# Patient Record
Sex: MALE | Race: WHITE
[De-identification: names, ages, dates, MRNs, and addresses within clinical notes are randomized per-mention and may not be internally consistent; named-entity substitution may affect disease eponyms.]

---

## 2018-03-08 ENCOUNTER — HOSPITAL ENCOUNTER (OUTPATIENT)
Dept: HOSPITAL 56 - MW.SDS | Age: 53
Discharge: HOME | End: 2018-03-08
Attending: SURGERY
Payer: COMMERCIAL

## 2018-03-08 DIAGNOSIS — F43.10: ICD-10-CM

## 2018-03-08 DIAGNOSIS — F17.290: ICD-10-CM

## 2018-03-08 DIAGNOSIS — Z79.4: ICD-10-CM

## 2018-03-08 DIAGNOSIS — D12.4: ICD-10-CM

## 2018-03-08 DIAGNOSIS — Z79.899: ICD-10-CM

## 2018-03-08 DIAGNOSIS — Z99.89: ICD-10-CM

## 2018-03-08 DIAGNOSIS — Z12.11: Primary | ICD-10-CM

## 2018-03-08 DIAGNOSIS — E10.9: ICD-10-CM

## 2018-03-08 DIAGNOSIS — E66.9: ICD-10-CM

## 2018-03-08 DIAGNOSIS — I10: ICD-10-CM

## 2018-03-08 DIAGNOSIS — Z91.041: ICD-10-CM

## 2018-03-08 DIAGNOSIS — F41.9: ICD-10-CM

## 2018-03-08 DIAGNOSIS — Z86.718: ICD-10-CM

## 2018-03-08 DIAGNOSIS — Z79.82: ICD-10-CM

## 2018-03-08 DIAGNOSIS — G47.19: ICD-10-CM

## 2018-03-08 DIAGNOSIS — G47.33: ICD-10-CM

## 2018-03-08 DIAGNOSIS — D12.5: ICD-10-CM

## 2018-03-08 DIAGNOSIS — G47.00: ICD-10-CM

## 2018-03-08 PROCEDURE — 43239 EGD BIOPSY SINGLE/MULTIPLE: CPT

## 2018-03-08 PROCEDURE — 45380 COLONOSCOPY AND BIOPSY: CPT

## 2018-03-08 NOTE — OR
SURGEON:

JUNAID ODONNELL MD

 

DATE OF PROCEDURE:  03/08/2018

 

PREOPERATIVE DIAGNOSES:

1. Abdominal pain.

2. Screening colonoscopy.

 

POSTOPERATIVE DIAGNOSES:

1. Normal esophagogastroduodenoscopy.

2. Sigmoid colon polyp.

3. Descending colon polyp.

 

PROCEDURE PERFORMED:

Diagnostic esophagogastroduodenoscopy and screening colonoscopy.

 

ANESTHESIA:

MAC.

 

INSTRUMENT USED:

Olympus endoscope and colonoscope.

 

EXTENT OF EXAM:

To the second portion of duodenum, to the cecum.

 

PREPARATION:

Good.

 

LIMITATIONS:

None.

 

INDICATION FOR EXAMINATION:

The patient is a 52-year-old male with a history of chronic pancreatitis, who

presents with chronic upper abdominal pain that has made better with the use of

over-the-counter antacids.  The decision was made to perform a diagnostic EGD to

rule out any gastritis or esophagitis.  The patient is also due for a screening

colonoscopy.  We discussed both procedures as well as expected perioperative

course.  We discussed the risks including bleeding, infection, damage to

surrounding structures including perforation.  The patient verbalized

understanding and wishes to proceed.

 

PROCEDURE IN DETAIL:

The patient was brought into the endoscopy suite and placed in left lateral

decubitus position.  A time-out was completed verifying the patient's name, age,

date of birth, allergies, and procedure to be performed.  A bite block was

placed in the patient's mouth and monitored anesthesia care was induced.

Continuous oxygen was provided via nasal cannula throughout the procedure.

After adequate sedation was achieved, a well lubricated endoscope was placed in

the patient's mouth and advanced under direct visualization to the level of the

second portion of duodenum.  This appeared normal and a photograph was taken.

The scope was then fully withdrawn while examining the color, texture, anatomy,

and integrity of the mucosa of the upper GI tract.  The mucosa of the small

intestine appeared normal and to be free of pathology.  The scope was brought in

the stomach and a photograph was taken of the pylorus and GE junction.  Both of

these appeared normal.  The stomach mucosa itself had no evidence of

inflammation or ulceration.  Biopsies were taken of the gastric antrum, body,

and fundus and sent for pathologic review.  The scope was then brought into the

distal esophagus.  A photograph was taken of the Z-line, which appeared normal.

The scope was then fully withdrawn while examining the esophageal mucosa.  This

all appeared normal.  The scope was then removed from the patient.  This portion

of procedure was terminated.  A digital rectal exam was performed.  This exam

was within normal limits.  A well lubricated colonoscope was inserted in the

rectum and advanced under direct visualization to the level of the cecum.  The

cecum was identified by both visual and anatomic landmarks.  A photograph was

taken of the cecal cap.  I was able to retroflex the scope within the cecum, but

unable to take a picture.  The scope was then fully withdrawn while examining

the color, texture, anatomy, and integrity of the mucosa from the cecum to the

anal canal.  The patient was found to have 2 to 3 mm polyps within the

descending colon and sigmoid colon.  These were removed using a cold biopsy

forceps.  The scope was then brought into the rectum and retroflexed to allow

visualization of the anal canal opening.  This appeared normal and a photograph

was taken.  The scope was then straightened out and removed from the patient.

Cecum to anus time was 8 minutes.  The patient tolerated the procedure well and

was taken to PACU in stable condition.

 

ENDOSCOPIC DIAGNOSES:

1. Normal esophagogastroduodenoscopy.

2. Sigmoid colon polyp.

3. Descending colon polyp.

 

RECOMMENDATIONS:

Follow up in clinic in 2 weeks.

 

 

SHOSHANA NAVARRETE

DD:  03/08/2018 14:41:17

DT:  03/08/2018 15:46:08

Job #:  [id number]

## 2018-03-08 NOTE — PCM.PREANE
Preanesthetic Assessment





- Anesthesia/Transfusion/Family Hx


Anesthesia History: Prior Anesthesia Without Reaction


Family History of Anesthesia Reaction: No


Transfusion History: Prior Transfusion Without Reaction


Intubation History: Unknown





- Review of Systems


General: No Symptoms


Pulmonary: No Symptoms


Cardiovascular: No Symptoms


Gastrointestinal: Abdominal Pain


Neurological: No Symptoms


Other: Reports: None





- Physical Assessment


Height: 1.73 m


Weight: 91.626 kg


ASA Class: 3


Mental Status: Alert & Oriented x3


Airway Class: Mallampati = 2


Dentition: Reports: Normal Dentition


Thyro-Mental Finger Breadths: 3


Mouth Opening Finger Breadths: 3


ROM/Head Extension: Full


Lungs: Clear to Auscultation, Normal Respiratory Effort


Cardiovascular: Regular Rate, Regular Rhythm





- Allergies


Allergies/Adverse Reactions: 


 Allergies











Allergy/AdvReac Type Severity Reaction Status Date / Time


 


Iodinated Contrast- Oral and Allergy  Anaphylactic Verified 03/05/18 12:26





IV Dye   Shock  














- Blood


Blood Available: No





- Anesthesia Plan


Pre-Op Medication Ordered: None





- Acknowledgements


Anesthesia Type Planned: MAC


Pt an Appropriate Candidate for the Planned Anesthesia: Yes


Alternatives and Risks of Anesthesia Discussed w Pt/Guardian: Yes


Pt/Guardian Understands and Agrees with Anesthesia Plan: Yes





PreAnesthesia Questionnaire


Cardiovascular History: Reports: Blood Clots/VTE/DVT, Hypertension


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 

caused multiple organ failure, in a coma x4 weeks, developed blood clots in his 

legs, had IVC filter inserted as a precaution


Respiratory History: Reports: Sleep Apnea


Other Respiratory History: does not use a CPAP


Gastrointestinal History: Reports: GERD, Pancreatitis, Other (See Below)


Other Gastrointestinal History: h/o chronic pancreatitis sec to ETOH abuse, has 

pancreatic pseudocyst


Musculoskeletal History: Reports: Back Pain, Chronic, Fracture


Other Musculoskeletal History: hx of fx hand


Psychiatric History: Reports: Anxiety, PTSD


Endocrine/Metabolic History: Reports: Diabetes, Type I (A1C 6.1 recently), 

Obesity/BMI 30+





- Past Surgical History


Head Surgeries/Procedures: Reports: None


Cardiovascular Surgical History: Reports: Other (See Below)


Other Cardiovascular Surgeries/Procedures: insertion of IVC filter


GI Surgical History: Reports: EGD


Musculoskeletal Surgical History: Reports: Arthroscopic Knee (rt. knee open 

surgery), Other (See Below)


Other Musculoskeletal Surgeries/Procedures:: closed reduction percutaneus 

pinning right hand- pin removed





- SUBSTANCE USE


Smoking Status *Q: Current Every Day Smoker


Tobacco Use Within Last Twelve Months: Smokeless Tobacco


Recreational Drug Use History: No





- HOME MEDS


Home Medications: 


 Home Meds





Aspirin [Adult Low Dose Aspirin EC] 81 mg PO DAILY 03/05/18 [History]


Buprenorphine HCl/Naloxone HCl [Suboxone 4 mg-1 mg Sl Film] 2 - 3 mg PO DAILY 03 /05/18 [History]


Hydrochlorothiazide 25 mg PO DAILY 03/05/18 [History]


Insulin Glarg,Human.Rec.Analog [Lantus] 20 unit SQ BEDTIME 03/05/18 [History]


Insulin Lispro [HumaLOG] 5 unit SQ TIDMEALS 03/05/18 [History]


Lisinopril 20 mg PO DAILY 03/05/18 [History]











- CURRENT (IN HOUSE) MEDS


Current Meds: 





 Current Medications





Lactated Ringer's (Ringers, Lactated)  1,000 mls @ 125 mls/hr IV ASDIRECTED GUILLERMO


   Last Admin: 03/08/18 12:23 Dose:  125 mls/hr


Sodium Chloride (Saline Flush)  10 ml FLUSH ASDIRECTED PRN


   PRN Reason: Keep Vein Open


Sodium Chloride (Saline Flush)  2.5 ml FLUSH ASDIRECTED PRN


   PRN Reason: Keep Vein Open


Sodium Chloride (Saline Flush)  10 ml FLUSH ASDIRECTED PRN


   PRN Reason: Keep Vein Open


Sodium Chloride (Saline Flush)  2.5 ml FLUSH ASDIRECTED PRN


   PRN Reason: Keep Vein Open





Discontinued Medications





Lidocaine (Xylocaine-Mpf 2%) Confirm Administered Dose 5 ml .ROUTE .STK-MED ONE


   Stop: 03/08/18 07:34


Propofol (Diprivan  20 Ml) Confirm Administered Dose 400 mg .ROUTE .STK-MED ONE


   Stop: 03/08/18 07:35


Propofol (Diprivan  20 Ml) Confirm Administered Dose 1,400 mg .ROUTE .STK-MED 

ONE


   Stop: 03/08/18 07:38

## 2021-08-30 NOTE — EDM.PDOC
ED HPI GENERAL MEDICAL PROBLEM





- General


Chief Complaint: Diabetic Complaint


Stated Complaint: LOW BLOOD SUGAR


Time Seen by Provider: 08/30/21 23:29





- History of Present Illness


INITIAL COMMENTS - FREE TEXT/NARRATIVE: 


56-year-old male with a history of type 1 diabetes, prior IVC filter related 

blood clots on Eliquis, hypertension presents with stubborn hypoglycemia.  

Patient notes that over the last 2 to 3 days he has had some unusually low blood

sugars.  This evening the patient took 2 units of fast acting insulin with 

dinner and then about an hour later took his normal long-acting insulin shot.  

Over the last few hours he is been unable to keep his blood sugar up despite a 

large amount of p.o. sugar.  They were able to get it up to 100 but then it 

dropped again to 40.  On EMS arrival he was given D50 and then started on D10 

normal saline.  The patient denies any other symptoms, no cough no chest pain no

shortness of breath no abdominal pain no dysuria no hematuria no fever.  He has 

not had trouble with stubborn hypoglycemia in the past.


Treatments PTA: Reports: IV/IO, Other Medication(s)


Other Treatments PTA: D 10 infusing





- Related Data


                                    Allergies











Allergy/AdvReac Type Severity Reaction Status Date / Time


 


Iodinated Contrast Media Allergy  Anaphylactic Verified 08/30/21 23:42





[Iodinated Contrast- Oral   Shock  





and IV Dye]     











Home Meds: 


                                    Home Meds





Buprenorphine HCl/Naloxone HCl [Suboxone 4 mg-1 mg Sl Film] 2 - 3 mg PO DAILY 

03/05/18 [History]


Insulin Glarg,Human.Rec.Analog [Lantus] 20 unit SQ BEDTIME 03/05/18 [History]


Insulin Lispro [HumaLOG] 5 unit SQ TIDMEALS 03/05/18 [History]


Lisinopril 20 mg PO DAILY 03/05/18 [History]


hydroCHLOROthiazide [Hydrochlorothiazide] 25 mg PO DAILY 03/05/18 [History]


Apixaban [Eliquis] 5 mg PO BID 08/30/21 [History]











Past Medical History


Cardiovascular History: Reports: Blood Clots/VTE/DVT, Hypertension


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 

caused multiple organ failure, in a coma x4 weeks, developed blood clots in his 

legs, had IVC filter inserted as a precaution


Respiratory History: Reports: Sleep Apnea


Other Respiratory History: does not use a CPAP


Gastrointestinal History: Reports: GERD, Pancreatitis, Other (See Below)


Other Gastrointestinal History: h/o chronic pancreatitis sec to ETOH abuse, has 

pancreatic pseudocyst


Musculoskeletal History: Reports: Back Pain, Chronic, Fracture


Other Musculoskeletal History: hx of fx hand


Psychiatric History: Reports: Anxiety, PTSD


Endocrine/Metabolic History: Reports: Diabetes, Type I (A1C 6.1 recently), 

Obesity/BMI 30+





- Past Surgical History


Head Surgeries/Procedures: Reports: None


Cardiovascular Surgical History: Reports: Other (See Below)


Other Cardiovascular Surgeries/Procedures: insertion of IVC filter


GI Surgical History: Reports: EGD


Musculoskeletal Surgical History: Reports: Arthroscopic Knee (rt. knee open 

surgery), Other (See Below)


Other Musculoskeletal Surgeries/Procedures:: closed reduction percutaneus 

pinning right hand- pin removed





ED ROS GENERAL





- Review of Systems


Review Of Systems: See Below


Free Text/Narrative/Comment: 





General: No fever.


Skin: No rash.


Eyes: No vision problems.


ENT: No sore throat.


Neck: No neck stiffness.


Respiratory: No shortness of breath.


Cardiac: No chest pain.


Gastrointestinal: No nausea, vomiting or abdominal pain.


Urinary: No dysuria.


Musculoskeletal: No myalgias/arthralgias.


Neurologic: No headache.





ED EXAM GENERAL NO PERIP PULSE





- Physical Exam


Exam: See Below


Text/Narrative:: 





General Appearance: No acute distress, appears comfortable


Skin: No rash


HEENT: Normocephalic/atraumatic, sclera anicteric, mucous membranes moist


Neck: Normal range of motion


Chest and Lungs: Bilateral breath sounds, clear to auscultation


Cardiovascular: Regular rate and rhythm, no murmur


Abdomen: Soft, non-tender


Back: Normal


Musculoskeletal: No edema or tenderness


Neurologic: Awake, alert, no obvious deficits, moving all extremities


Psychiatric: Appropriate, cooperative





Course





- Vital Signs


Last Recorded V/S: 


                                Last Vital Signs











Temp  97.9 F   08/30/21 23:39


 


Pulse  60   08/30/21 23:39


 


Resp  16   08/30/21 23:39


 


BP  105/63   08/30/21 23:39


 


Pulse Ox  95   08/30/21 23:39














- Orders/Labs/Meds


Orders: 


                               Active Orders 24 hr











 Category Date Time Status


 


 Patient Status [ADT] Routine ADT  08/31/21 00:51 Active


 


 Accu Check [Blood Glucose Check, Bedside] [RC] ONETIME Care  08/31/21 00:30 

Active


 


 Abdomen Pelvis w wo Cont [CT] Stat Exams  08/31/21 00:39 Stop Req


 


 CORONAVIRUS COVID-19 CRISTY [MOLEC] Stat Lab  08/31/21 00:58 Ordered


 


 Dextrose 5%-Normal Saline @ 100 MLS/HR(1000ml) Med  08/31/21 01:00 Ordered





 Dextrose 5%-0.9% NaCl [Dextrose 5%-Normal Saline] 1,000   





 ml   





 IV ASDIRECTED   


 


 Sodium Chloride 0.9% [Saline Flush] Med  08/30/21 23:53 Active





 10 ml FLUSH ASDIRECTED PRN   


 


 Sodium Chloride 0.9% [Saline Flush] Med  08/30/21 23:53 Active





 2.5 ml FLUSH ASDIRECTED PRN   


 


 Sodium Chloride 23.4% 154 meq Med  08/31/21 01:00 Active





 Dextrose 10% in Water 1,000 ml   





 IV ASDIRECTED   


 


 Saline Lock Insert [OM.PC] Stat Oth  08/30/21 23:54 Ordered








                                Medication Orders





Sodium Chloride 154 meq/ (Dextrose/Water)  1,038.5 mls @ 80 mls/hr IV ASDIRECTED

 GUILLERMO


Sodium Chloride (Sodium Chloride 0.9% 10 Ml Syringe)  10 ml FLUSH ASDIRECTED PRN


   PRN Reason: Keep Vein Open


   Last Admin: 08/31/21 00:31  Dose: 10 ml


   Documented by: FLOYD


Sodium Chloride (Sodium Chloride 0.9% 2.5 Ml Syringe)  2.5 ml FLUSH ASDIRECTED 

PRN


   PRN Reason: Keep Vein Open


   Last Admin: 08/31/21 00:31  Dose: 2.5 ml


   Documented by: FLOYD








Labs: 


                                Laboratory Tests











  08/30/21 08/30/21 08/31/21 Range/Units





  23:43 23:43 00:44 


 


WBC  7.11    (4.0-11.0)  K/uL


 


RBC  4.68    (4.50-5.90)  M/uL


 


Hgb  13.0    (13.0-17.0)  g/dL


 


Hct  37.9 L    (38.0-50.0)  %


 


MCV  81.0    (80.0-98.0)  fL


 


MCH  27.8    (27.0-32.0)  pg


 


MCHC  34.3    (31.0-37.0)  g/dL


 


RDW Std Deviation  39.9    (28.0-62.0)  fl


 


RDW Coeff of Demetrius  14    (11.0-15.0)  %


 


Plt Count  198    (150-400)  K/uL


 


MPV  10.20    (7.40-12.00)  fL


 


Neut % (Auto)  70.9    (48.0-80.0)  %


 


Lymph % (Auto)  16.2    (16.0-40.0)  %


 


Mono % (Auto)  11.5    (0.0-15.0)  %


 


Eos % (Auto)  1.0    (0.0-7.0)  %


 


Baso % (Auto)  0.4    (0.0-1.5)  %


 


Neut # (Auto)  5.0    (1.4-5.7)  K/uL


 


Lymph # (Auto)  1.2    (0.6-2.4)  K/uL


 


Mono # (Auto)  0.8    (0.0-0.8)  K/uL


 


Eos # (Auto)  0.1    (0.0-0.7)  K/uL


 


Baso # (Auto)  0.0    (0.0-0.1)  K/uL


 


Nucleated RBC %  0.0    /100WBC


 


Nucleated RBCs #  0    K/uL


 


Sodium   143   (136-148)  mmol/L


 


Potassium   3.4 L   (3.5-5.1)  mmol/L


 


Chloride   101   ()  mmol/L


 


Carbon Dioxide   33.8 H   (21.0-32.0)  mmol/L


 


BUN   20 H   (7.0-18.0)  mg/dL


 


Creatinine   1.2   (0.8-1.3)  mg/dL


 


Est Cr Clr Drug Dosing   TNP   


 


Estimated GFR (MDRD)   > 60.0   ml/min


 


Glucose   74   ()  mg/dL


 


POC Glucose    158 H  (70-99)  mg/dL


 


Calcium   8.8   (8.5-10.1)  mg/dL


 


Total Bilirubin   0.5   (0.2-1.0)  mg/dL


 


AST   18   (15-37)  IU/L


 


ALT   25   (14-63)  IU/L


 


Alkaline Phosphatase   61   ()  U/L


 


Total Protein   6.3 L   (6.4-8.2)  g/dL


 


Albumin   3.7   (3.4-5.0)  g/dL


 


Globulin   2.6   (2.6-4.0)  g/dL


 


Albumin/Globulin Ratio   1.4   (0.9-1.6)  


 


Lipase   20 L   ()  U/L











Meds: 


Medications











Generic Name Dose Route Start Last Admin





  Trade Name Freq  PRN Reason Stop Dose Admin


 


Sodium Chloride 154 meq/  1,038.5 mls @ 80 mls/hr  08/31/21 01:00 





  Dextrose/Water  IV  





  ASDIRECTED GUILLERMO  


 


Sodium Chloride  10 ml  08/30/21 23:53  08/31/21 00:31





  Sodium Chloride 0.9% 10 Ml Syringe  FLUSH   10 ml





  ASDIRECTED PRN   Administration





  Keep Vein Open  


 


Sodium Chloride  2.5 ml  08/30/21 23:53  08/31/21 00:31





  Sodium Chloride 0.9% 2.5 Ml Syringe  FLUSH   2.5 ml





  ASDIRECTED PRN   Administration





  Keep Vein Open  














Discontinued Medications














Generic Name Dose Route Start Last Admin





  Trade Name Freq  PRN Reason Stop Dose Admin


 


Dextrose/Water  Confirm  08/31/21 00:24  08/31/21 00:27





  50% Dextrose In Water 50 Ml Syringe  Administered  08/31/21 00:25  Not Given





  Dose  





  50 ml  





  .ROUTE  





  .STK-MED ONE  


 


Dextrose/Water  50 ml  08/31/21 00:27  08/31/21 00:28





  50% Dextrose In Water 50 Ml Syringe  IVPUSH  08/31/21 00:28  50 ml





  ONETIME ONE   Administration














Departure





- Departure


Time of Disposition: 00:59


Disposition: Refer to Observation


Condition: Good


Clinical Impression: 


 Hypoglycemia








- Discharge Information


Referrals: 


Tk Whitt MD [Primary Care Provider] - 


Forms:  ED Department Discharge





Sepsis Event Note (ED)





- Focused Exam


Vital Signs: 


                                   Vital Signs











  Temp Pulse Resp BP Pulse Ox


 


 08/30/21 23:39  97.9 F  60  16  105/63  95














- My Orders


Last 24 Hours: 


My Active Orders





08/30/21 23:53


Sodium Chloride 0.9% [Saline Flush]   10 ml FLUSH ASDIRECTED PRN 


Sodium Chloride 0.9% [Saline Flush]   2.5 ml FLUSH ASDIRECTED PRN 





08/30/21 23:54


Saline Lock Insert [OM.PC] Stat 





08/31/21 00:30


Accu Check [Blood Glucose Check, Bedside] [RC] ONETIME 





08/31/21 00:39


Abdomen Pelvis w wo Cont [CT] Stat 





08/31/21 00:51


Patient Status [ADT] Routine 





08/31/21 00:58


CORONAVIRUS COVID-19 CRISTY [MOLEC] Stat 





08/31/21 01:00


Dextrose 5%-Normal Saline @ 100 MLS/HR(1000ml) Dextrose 5%-0.9% NaCl [Dextrose 

5%-Normal Saline] 1,000 ml IV ASDIRECTED 


Sodium Chloride 23.4% 154 meq   Dextrose 10% in Water 1,000 ml IV ASDIRECTED 














- Assessment/Plan


Last 24 Hours: 


My Active Orders





08/30/21 23:53


Sodium Chloride 0.9% [Saline Flush]   10 ml FLUSH ASDIRECTED PRN 


Sodium Chloride 0.9% [Saline Flush]   2.5 ml FLUSH ASDIRECTED PRN 





08/30/21 23:54


Saline Lock Insert [OM.PC] Stat 





08/31/21 00:30


Accu Check [Blood Glucose Check, Bedside] [RC] ONETIME 





08/31/21 00:39


Abdomen Pelvis w wo Cont [CT] Stat 





08/31/21 00:51


Patient Status [ADT] Routine 





08/31/21 00:58


CORONAVIRUS COVID-19 CRISTY [MOLEC] Stat 





08/31/21 01:00


Dextrose 5%-Normal Saline @ 100 MLS/HR(1000ml) Dextrose 5%-0.9% NaCl [Dextrose 

5%-Normal Saline] 1,000 ml IV ASDIRECTED 


Sodium Chloride 23.4% 154 meq   Dextrose 10% in Water 1,000 ml IV ASDIRECTED 











Assessment:: 





56-year-old male presenting with refractory hypoglycemia now stabilized after IV

dextrose.  New liver dysfunction is a consideration infection is a consideration

alcohol is a consideration though felt less likely.  Labs are pending we will 

continue to reassess.





0100: Patient again required push of D50 for hypoglycemia.  Labs are 

unremarkable.  Patient discussed in full with hospitalist will admit for further

care.  Patient currently refusing contrasted CT scan due to prior renal 

dysfunction related to it.  CT canceled for now.  Unfortunately do not have any 

D10 made up.  Given this patient will be started on D5.

## 2021-08-31 NOTE — PCM.HP.2
H&P History of Present Illness





- General


Date of Service: 08/31/21


Admit Problem/Dx: 


                           Admission Diagnosis/Problem





Admission Diagnosis/Problem      Hypoglycemia








Source of Information: Patient, Provider


History Limitations: Reports: No Limitations





- History of Present Illness


Initial Comments - Free Text/Narative: 





56-year-old male with a history of type 1 diabetes, prior IVC filter related 

blood clots on Eliquis, hypertension presents with refractory hypoglycemia.  

Patient notes that over the last 2 to 3 days he has had some unusually low blood

sugars.  This evening the patient took 2 units of fast acting insulin with 

dinner and then about an hour later took his normal long-acting insulin shot. 

Patient states that following his insulin administration his blood sugars have b

een dropping to 40s and 50s and even after taking lots of carbs and juice by 

mouth he can barely get the sugars back up to 90s and then it falls back down 

with the next an hour or so. On EMS arrival he was given D50 and then started on

D10 normal saline.  The patient denies any other symptoms, no cough no chest 

pain no shortness of breath no abdominal pain no dysuria no hematuria no fever. 

He has not had trouble with stubborn hypoglycemia in the past. In the ER lab 

work was unremarkable, patient was started on dextrose LR in view of refractory 

hypoglycemia. Patient was admitted to the hospital for further management. Upon 

further questioning it looks like patient has lost significant weight over last 

few months. Also patient had been rigorously working in the shed that day 

burning a lot of calories. Patient states that his hemoglobin A1c is 6.5 and he 

maintains a very tight blood sugar control. Patient denies chest pain, nausea, 

vomiting, abdominal pain, constipation, diarrhea. Overnight patient's dextrose 

gtt. was stopped and ever since patient's blood sugars have been steadily in 

200s. Patient is eating and drinking well. 





- Related Data


Allergies/Adverse Reactions: 


                                    Allergies











Allergy/AdvReac Type Severity Reaction Status Date / Time


 


Iodinated Contrast Media Allergy  Anaphylactic Verified 08/31/21 07:47





[Iodinated Contrast- Oral   Shock  





and IV Dye]     











Home Medications: 


                                    Home Meds





Buprenorphine HCl/Naloxone HCl [Suboxone 4 mg-1 mg Sl Film] 2 - 3 mg PO DAILY 

03/05/18 [History]


Insulin Lispro [HumaLOG] 5 unit SQ TIDMEALS 03/05/18 [History]


Lisinopril 20 mg PO DAILY 03/05/18 [History]


hydroCHLOROthiazide [Hydrochlorothiazide] 25 mg PO DAILY 03/05/18 [History]


Apixaban [Eliquis] 5 mg PO BID 08/30/21 [History]


Insulin Glarg,Human.Rec.Analog [Lantus] 10 unit SUBCUT BEDTIME #1 ampule 

08/31/21 [Rx]











Past Medical History


HEENT History: Reports: Impaired Vision


Cardiovascular History: Reports: Blood Clots/VTE/DVT, Hypertension


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 

caused multiple organ failure, in a coma x4 weeks, developed blood clots in his 

legs, had IVC filter inserted as a precaution


Respiratory History: Reports: Sleep Apnea


Other Respiratory History: does not use a CPAP


Gastrointestinal History: Reports: GERD, Pancreatitis, Other (See Below)


Other Gastrointestinal History: h/o chronic pancreatitis sec to ETOH abuse, has 

pancreatic pseudocyst


Musculoskeletal History: Reports: Back Pain, Chronic, Fracture


Other Musculoskeletal History: hx of fx hand


Psychiatric History: Reports: Addiction, Anxiety, PTSD


Endocrine/Metabolic History: Reports: Diabetes, Type I, Obesity/BMI 30+





- Past Surgical History


Head Surgeries/Procedures: Reports: None


Cardiovascular Surgical History: Reports: Other (See Below)


Other Cardiovascular Surgeries/Procedures: insertion of IVC filter


GI Surgical History: Reports: EGD


Musculoskeletal Surgical History: Reports: Arthroscopic Knee, Other (See Below)


Other Musculoskeletal Surgeries/Procedures:: closed reduction percutaneus 

pinning right hand- pin removed





Social & Family History





- Tobacco Use


Tobacco Use Status *Q: Never Tobacco User


Second Hand Smoke Exposure: No





- Caffeine Use


Caffeine Use: Reports: Coffee





- Recreational Drug Use


Recreational Drug Use: No





H&P Review of Systems





- Review of Systems:


Review Of Systems: See Below


General: Denies: Fever, Chills, Malaise, Weakness


Pulmonary: Denies: Shortness of Breath, Wheezing


Cardiovascular: Denies: Chest Pain, Palpitations, Dyspnea on Exertion


Gastrointestinal: Denies: Abdominal Pain, Anorexia, Black Stool


Genitourinary: Denies: Dysuria, Frequency


Musculoskeletal: Denies: Neck Pain, Shoulder Pain, Arm Pain, Back Pain


Skin: Denies: Cyanosis, Jaundice, Mottled


Psychiatric: Denies: Confusion, Depression, Mood Lability





Exam





- Exam


Exam: See Below





- Vital Signs


Vital Signs: 


                                Last Vital Signs











Temp  36.2 C   08/31/21 11:33


 


Pulse  55 L  08/31/21 11:33


 


Resp  22 H  08/31/21 11:33


 


BP  111/72   08/31/21 11:33


 


Pulse Ox  94 L  08/31/21 11:33











Weight: 91.172 kg





- Exam


General: Alert, Oriented, Cooperative


Neck: Supple


Lungs: Clear to Auscultation, Normal Respiratory Effort


Cardiovascular: Regular Rate, Regular Rhythm, Normal S1, Normal S2


GI/Abdominal Exam: Normal Bowel Sounds, Soft, Non-Tender





- Patient Data


Lab Results Last 24 hrs: 


                         Laboratory Results - last 24 hr











  08/30/21 08/30/21 08/31/21 Range/Units





  23:43 23:43 00:44 


 


WBC  7.11    (4.0-11.0)  K/uL


 


RBC  4.68    (4.50-5.90)  M/uL


 


Hgb  13.0    (13.0-17.0)  g/dL


 


Hct  37.9 L    (38.0-50.0)  %


 


MCV  81.0    (80.0-98.0)  fL


 


MCH  27.8    (27.0-32.0)  pg


 


MCHC  34.3    (31.0-37.0)  g/dL


 


RDW Std Deviation  39.9    (28.0-62.0)  fl


 


RDW Coeff of Demetrius  14    (11.0-15.0)  %


 


Plt Count  198    (150-400)  K/uL


 


MPV  10.20    (7.40-12.00)  fL


 


Neut % (Auto)  70.9    (48.0-80.0)  %


 


Lymph % (Auto)  16.2    (16.0-40.0)  %


 


Mono % (Auto)  11.5    (0.0-15.0)  %


 


Eos % (Auto)  1.0    (0.0-7.0)  %


 


Baso % (Auto)  0.4    (0.0-1.5)  %


 


Neut # (Auto)  5.0    (1.4-5.7)  K/uL


 


Lymph # (Auto)  1.2    (0.6-2.4)  K/uL


 


Mono # (Auto)  0.8    (0.0-0.8)  K/uL


 


Eos # (Auto)  0.1    (0.0-0.7)  K/uL


 


Baso # (Auto)  0.0    (0.0-0.1)  K/uL


 


Nucleated RBC %  0.0    /100WBC


 


Nucleated RBCs #  0    K/uL


 


Sodium   143   (136-148)  mmol/L


 


Potassium   3.4 L   (3.5-5.1)  mmol/L


 


Chloride   101   ()  mmol/L


 


Carbon Dioxide   33.8 H   (21.0-32.0)  mmol/L


 


BUN   20 H   (7.0-18.0)  mg/dL


 


Creatinine   1.2   (0.8-1.3)  mg/dL


 


Est Cr Clr Drug Dosing   TNP   


 


Estimated GFR (MDRD)   > 60.0   ml/min


 


Glucose   74   ()  mg/dL


 


POC Glucose    158 H  (70-99)  mg/dL


 


Calcium   8.8   (8.5-10.1)  mg/dL


 


Total Bilirubin   0.5   (0.2-1.0)  mg/dL


 


AST   18   (15-37)  IU/L


 


ALT   25   (14-63)  IU/L


 


Alkaline Phosphatase   61   ()  U/L


 


Total Protein   6.3 L   (6.4-8.2)  g/dL


 


Albumin   3.7   (3.4-5.0)  g/dL


 


Globulin   2.6   (2.6-4.0)  g/dL


 


Albumin/Globulin Ratio   1.4   (0.9-1.6)  


 


Lipase   20 L   ()  U/L


 


SARS-CoV-2 RNA (CRISTY)     (NEGATIVE)  














  08/31/21 08/31/21 08/31/21 Range/Units





  01:14 02:03 03:38 


 


WBC     (4.0-11.0)  K/uL


 


RBC     (4.50-5.90)  M/uL


 


Hgb     (13.0-17.0)  g/dL


 


Hct     (38.0-50.0)  %


 


MCV     (80.0-98.0)  fL


 


MCH     (27.0-32.0)  pg


 


MCHC     (31.0-37.0)  g/dL


 


RDW Std Deviation     (28.0-62.0)  fl


 


RDW Coeff of Demetrius     (11.0-15.0)  %


 


Plt Count     (150-400)  K/uL


 


MPV     (7.40-12.00)  fL


 


Neut % (Auto)     (48.0-80.0)  %


 


Lymph % (Auto)     (16.0-40.0)  %


 


Mono % (Auto)     (0.0-15.0)  %


 


Eos % (Auto)     (0.0-7.0)  %


 


Baso % (Auto)     (0.0-1.5)  %


 


Neut # (Auto)     (1.4-5.7)  K/uL


 


Lymph # (Auto)     (0.6-2.4)  K/uL


 


Mono # (Auto)     (0.0-0.8)  K/uL


 


Eos # (Auto)     (0.0-0.7)  K/uL


 


Baso # (Auto)     (0.0-0.1)  K/uL


 


Nucleated RBC %     /100WBC


 


Nucleated RBCs #     K/uL


 


Sodium     (136-148)  mmol/L


 


Potassium     (3.5-5.1)  mmol/L


 


Chloride     ()  mmol/L


 


Carbon Dioxide     (21.0-32.0)  mmol/L


 


BUN     (7.0-18.0)  mg/dL


 


Creatinine     (0.8-1.3)  mg/dL


 


Est Cr Clr Drug Dosing     


 


Estimated GFR (MDRD)     ml/min


 


Glucose     ()  mg/dL


 


POC Glucose   138 H  273 H  (70-99)  mg/dL


 


Calcium     (8.5-10.1)  mg/dL


 


Total Bilirubin     (0.2-1.0)  mg/dL


 


AST     (15-37)  IU/L


 


ALT     (14-63)  IU/L


 


Alkaline Phosphatase     ()  U/L


 


Total Protein     (6.4-8.2)  g/dL


 


Albumin     (3.4-5.0)  g/dL


 


Globulin     (2.6-4.0)  g/dL


 


Albumin/Globulin Ratio     (0.9-1.6)  


 


Lipase     ()  U/L


 


SARS-CoV-2 RNA (CRISTY)  NEGATIVE    (NEGATIVE)  














  08/31/21 08/31/21 08/31/21 Range/Units





  06:29 07:33 11:13 


 


WBC     (4.0-11.0)  K/uL


 


RBC     (4.50-5.90)  M/uL


 


Hgb     (13.0-17.0)  g/dL


 


Hct     (38.0-50.0)  %


 


MCV     (80.0-98.0)  fL


 


MCH     (27.0-32.0)  pg


 


MCHC     (31.0-37.0)  g/dL


 


RDW Std Deviation     (28.0-62.0)  fl


 


RDW Coeff of Demetrius     (11.0-15.0)  %


 


Plt Count     (150-400)  K/uL


 


MPV     (7.40-12.00)  fL


 


Neut % (Auto)     (48.0-80.0)  %


 


Lymph % (Auto)     (16.0-40.0)  %


 


Mono % (Auto)     (0.0-15.0)  %


 


Eos % (Auto)     (0.0-7.0)  %


 


Baso % (Auto)     (0.0-1.5)  %


 


Neut # (Auto)     (1.4-5.7)  K/uL


 


Lymph # (Auto)     (0.6-2.4)  K/uL


 


Mono # (Auto)     (0.0-0.8)  K/uL


 


Eos # (Auto)     (0.0-0.7)  K/uL


 


Baso # (Auto)     (0.0-0.1)  K/uL


 


Nucleated RBC %     /100WBC


 


Nucleated RBCs #     K/uL


 


Sodium     (136-148)  mmol/L


 


Potassium     (3.5-5.1)  mmol/L


 


Chloride     ()  mmol/L


 


Carbon Dioxide     (21.0-32.0)  mmol/L


 


BUN     (7.0-18.0)  mg/dL


 


Creatinine     (0.8-1.3)  mg/dL


 


Est Cr Clr Drug Dosing     


 


Estimated GFR (MDRD)     ml/min


 


Glucose     ()  mg/dL


 


POC Glucose  344 H  335 H  286 H  (70-99)  mg/dL


 


Calcium     (8.5-10.1)  mg/dL


 


Total Bilirubin     (0.2-1.0)  mg/dL


 


AST     (15-37)  IU/L


 


ALT     (14-63)  IU/L


 


Alkaline Phosphatase     ()  U/L


 


Total Protein     (6.4-8.2)  g/dL


 


Albumin     (3.4-5.0)  g/dL


 


Globulin     (2.6-4.0)  g/dL


 


Albumin/Globulin Ratio     (0.9-1.6)  


 


Lipase     ()  U/L


 


SARS-CoV-2 RNA (CRISTY)     (NEGATIVE)  











Result Diagrams: 


                                 08/30/21 23:43





                                 08/30/21 23:43





Sepsis Event Note





- Evaluation


Sepsis Screening Result: No Definite Risk





- Focused Exam


Vital Signs: 


                                   Vital Signs











  Temp Pulse Resp BP Pulse Ox


 


 08/31/21 11:33  36.2 C  55 L  22 H  111/72  94 L


 


 08/31/21 07:44  36.7 C  74  22 H  111/55 L  95


 


 08/31/21 03:16  36.1 C  68  16  145/72 H  98


 


 08/31/21 02:29   66  16  114/61  97


 


 08/31/21 01:13   56 L  17  128/71  94 L














- Problem List


(1) Diabetes mellitus


SNOMED Code(s): 81389665


   ICD Code: E11.9 - TYPE 2 DIABETES MELLITUS WITHOUT COMPLICATIONS   Status: 

Acute   Current Visit: Yes   





(2) Hypoglycemia


SNOMED Code(s): 633181995


   ICD Code: E16.2 - HYPOGLYCEMIA, UNSPECIFIED   Status: Acute   Current Visit: 

Yes   


Problem List Initiated/Reviewed/Updated: Yes


Orders Last 24hrs: 


                               Active Orders 24 hr











 Category Date Time Status


 


 Patient Status [ADT] Routine ADT  08/31/21 00:51 Active


 


 Accu Check [Blood Glucose Check, Bedside] [RC] ONETIME Care  08/31/21 00:30 

Active


 


 Accu Check [Blood Glucose Check, Bedside] [RC] Q4H Care  08/31/21 06:00 Active


 


 Antiembolic Devices [RC] PER UNIT ROUTINE Care  08/31/21 02:52 Active


 


 Oxygen Therapy Adult [Oxygen Therapy] [RC] ASDIRECTED Care  08/31/21 02:51 

Active


 


 RT Aerosol Therapy [RC] ASDIRECTED Care  08/31/21 02:54 Active


 


 Telemetry Monitoring [Cardiac Monitoring] [RC] .AS Care  08/31/21 06:03 Active





 DIRECTED   


 


 Vital Signs [RC] Q4H Care  08/31/21 08:00 Active


 


 ADA Diabetic [American Diabetic Association Diet] [DIET Diet  08/31/21 

Breakfast Active





 ]   


 


 Acetaminophen [TylenoL] Med  08/31/21 02:53 Active





 650 mg PO Q4H PRN   


 


 Albuterol/Ipratropium [DuoNeb 3.0-0.5 MG/3 ML] Med  08/31/21 02:54 Active





 3 ml NEB Q4HRRT PRN   


 


 Calcium Carbonate [Tums] Med  08/31/21 03:58 Active





 500 mg PO Q2HR PRN   


 


 Dextrose 5%-Lactated Ringers 1,000 ml Med  08/31/21 03:00 Active





 IV ASDIRECTED   


 


 Ondansetron [Zofran] Med  08/31/21 02:54 Active





 4 mg IVPUSH Q4H PRN   


 


 Sodium Chloride 0.9% [Saline Flush] Med  08/30/21 23:53 Active





 10 ml FLUSH ASDIRECTED PRN   


 


 Sodium Chloride 0.9% [Saline Flush] Med  08/30/21 23:53 Active





 2.5 ml FLUSH ASDIRECTED PRN   


 


 SCD [Sequential Compression Device] [OM.PC] Routine Oth  08/31/21 02:52 Ordered


 


 Saline Lock Insert [OM.PC] Stat Oth  08/30/21 23:54 Ordered








                                Medication Orders





Acetaminophen (Acetaminophen 325 Mg Tab)  650 mg PO Q4H PRN


   PRN Reason: Pain


Albuterol/Ipratropium (Albuterol/Ipratropium 3.0-0.5 Mg/3 Ml Neb Soln)  3 ml NEB

 Q4HRRT PRN


   PRN Reason: Shortness of Breath


Calcium Carbonate/Glycine (Calcium Carbonate 500 Mg Tab.Chew)  500 mg PO Q2HR 

PRN


   PRN Reason: Indigestion


   Last Admin: 08/31/21 04:18  Dose: 500 mg


   Documented by: HIMA


Dextrose/Lactated Ringer's (Dextrose 5%-Lactated Ringers)  1,000 mls @ 125 

mls/hr IV ASDIRECTED GUILLERMO


   Last Infusion: 08/31/21 06:44  Dose: 0 mls/hr


   Documented by: HIMA


   Admin: 08/31/21 03:32  Dose: 125 mls/hr


   Documented by: HIMA


Ondansetron HCl (Ondansetron 4 Mg/2 Ml Sdv)  4 mg IVPUSH Q4H PRN


   PRN Reason: Nausea/Vomiting


Sodium Chloride (Sodium Chloride 0.9% 10 Ml Syringe)  10 ml FLUSH ASDIRECTED PRN


   PRN Reason: Keep Vein Open


   Last Admin: 08/31/21 00:31  Dose: 10 ml


   Documented by: FLOYD


Sodium Chloride (Sodium Chloride 0.9% 2.5 Ml Syringe)  2.5 ml FLUSH ASDIRECTED 

PRN


   PRN Reason: Keep Vein Open


   Last Admin: 08/31/21 00:31  Dose: 2.5 ml


   Documented by: FLOYD








Assessment/Plan Comment:: 





Patient is a 56-year-old known type I diabetic who is on both mealtime short-

acting insulin and long-acting insulin at bedtime


Patient came in with refractory hypoglycemia


Looks like patient has lost quite a bit of weight last few months, also has been

 overly exerting himself during the day does bring extra calories, likely 

hypoglycemia due to or dosing on insulin


Patient was counseled about not overtreating his blood sugars and keep a goal of

 A1c around 6.8-6.9 instead of 6.4-6.5 as hypoglycemic episodes could have grave

 consequences


Patient has been off insulin overnight and today, will resume his mealtime 

insulin from tomorrow and will decrease his bedtime insulin to 10 units, patient

 normally takes 15 units at bedtime


Patient is recommended to check his blood sugars frequently every 4-6 hours for 

next 2 days


Patient has been off the IV dextrose for a while now and is eating and drinking 

well blood sugars have been stable he feels like he is ready to go home, patient

 will be discharged today with close follow-up with his PCP.